# Patient Record
Sex: FEMALE | Race: OTHER | NOT HISPANIC OR LATINO | URBAN - METROPOLITAN AREA
[De-identification: names, ages, dates, MRNs, and addresses within clinical notes are randomized per-mention and may not be internally consistent; named-entity substitution may affect disease eponyms.]

---

## 2024-01-09 ENCOUNTER — INPATIENT (INPATIENT)
Facility: HOSPITAL | Age: 74
LOS: 1 days | Discharge: HOME | End: 2024-01-11
Attending: HOSPITALIST | Admitting: HOSPITALIST
Payer: MEDICAID

## 2024-01-09 VITALS
HEART RATE: 166 BPM | RESPIRATION RATE: 18 BRPM | OXYGEN SATURATION: 99 % | DIASTOLIC BLOOD PRESSURE: 74 MMHG | SYSTOLIC BLOOD PRESSURE: 105 MMHG | WEIGHT: 154.32 LBS | HEIGHT: 65 IN | TEMPERATURE: 98 F

## 2024-01-09 LAB
ANION GAP SERPL CALC-SCNC: 7 MMOL/L — SIGNIFICANT CHANGE UP (ref 5–17)
ANION GAP SERPL CALC-SCNC: 7 MMOL/L — SIGNIFICANT CHANGE UP (ref 5–17)
APTT BLD: 29.3 SEC — SIGNIFICANT CHANGE UP (ref 24.5–35.6)
APTT BLD: 29.3 SEC — SIGNIFICANT CHANGE UP (ref 24.5–35.6)
BASOPHILS # BLD AUTO: 0.05 K/UL — SIGNIFICANT CHANGE UP (ref 0–0.2)
BASOPHILS # BLD AUTO: 0.05 K/UL — SIGNIFICANT CHANGE UP (ref 0–0.2)
BASOPHILS NFR BLD AUTO: 1.1 % — SIGNIFICANT CHANGE UP (ref 0–2)
BASOPHILS NFR BLD AUTO: 1.1 % — SIGNIFICANT CHANGE UP (ref 0–2)
BUN SERPL-MCNC: 33 MG/DL — HIGH (ref 7–23)
BUN SERPL-MCNC: 33 MG/DL — HIGH (ref 7–23)
CALCIUM SERPL-MCNC: 9.6 MG/DL — SIGNIFICANT CHANGE UP (ref 8.5–10.1)
CALCIUM SERPL-MCNC: 9.6 MG/DL — SIGNIFICANT CHANGE UP (ref 8.5–10.1)
CHLORIDE SERPL-SCNC: 103 MMOL/L — SIGNIFICANT CHANGE UP (ref 96–108)
CHLORIDE SERPL-SCNC: 103 MMOL/L — SIGNIFICANT CHANGE UP (ref 96–108)
CO2 SERPL-SCNC: 22 MMOL/L — SIGNIFICANT CHANGE UP (ref 22–31)
CO2 SERPL-SCNC: 22 MMOL/L — SIGNIFICANT CHANGE UP (ref 22–31)
CREAT SERPL-MCNC: 1.88 MG/DL — HIGH (ref 0.5–1.3)
CREAT SERPL-MCNC: 1.88 MG/DL — HIGH (ref 0.5–1.3)
EGFR: 28 ML/MIN/1.73M2 — LOW
EGFR: 28 ML/MIN/1.73M2 — LOW
EOSINOPHIL # BLD AUTO: 0.07 K/UL — SIGNIFICANT CHANGE UP (ref 0–0.5)
EOSINOPHIL # BLD AUTO: 0.07 K/UL — SIGNIFICANT CHANGE UP (ref 0–0.5)
EOSINOPHIL NFR BLD AUTO: 1.5 % — SIGNIFICANT CHANGE UP (ref 0–6)
EOSINOPHIL NFR BLD AUTO: 1.5 % — SIGNIFICANT CHANGE UP (ref 0–6)
FLUAV AG NPH QL: SIGNIFICANT CHANGE UP
FLUAV AG NPH QL: SIGNIFICANT CHANGE UP
FLUBV AG NPH QL: SIGNIFICANT CHANGE UP
FLUBV AG NPH QL: SIGNIFICANT CHANGE UP
GLUCOSE SERPL-MCNC: 116 MG/DL — HIGH (ref 70–99)
GLUCOSE SERPL-MCNC: 116 MG/DL — HIGH (ref 70–99)
HCT VFR BLD CALC: 33.7 % — LOW (ref 34.5–45)
HCT VFR BLD CALC: 33.7 % — LOW (ref 34.5–45)
HGB BLD-MCNC: 11.1 G/DL — LOW (ref 11.5–15.5)
HGB BLD-MCNC: 11.1 G/DL — LOW (ref 11.5–15.5)
IMM GRANULOCYTES NFR BLD AUTO: 0.4 % — SIGNIFICANT CHANGE UP (ref 0–0.9)
IMM GRANULOCYTES NFR BLD AUTO: 0.4 % — SIGNIFICANT CHANGE UP (ref 0–0.9)
INR BLD: 0.88 RATIO — SIGNIFICANT CHANGE UP (ref 0.85–1.18)
INR BLD: 0.88 RATIO — SIGNIFICANT CHANGE UP (ref 0.85–1.18)
LYMPHOCYTES # BLD AUTO: 1.21 K/UL — SIGNIFICANT CHANGE UP (ref 1–3.3)
LYMPHOCYTES # BLD AUTO: 1.21 K/UL — SIGNIFICANT CHANGE UP (ref 1–3.3)
LYMPHOCYTES # BLD AUTO: 26.3 % — SIGNIFICANT CHANGE UP (ref 13–44)
LYMPHOCYTES # BLD AUTO: 26.3 % — SIGNIFICANT CHANGE UP (ref 13–44)
MCHC RBC-ENTMCNC: 30.2 PG — SIGNIFICANT CHANGE UP (ref 27–34)
MCHC RBC-ENTMCNC: 30.2 PG — SIGNIFICANT CHANGE UP (ref 27–34)
MCHC RBC-ENTMCNC: 32.9 G/DL — SIGNIFICANT CHANGE UP (ref 32–36)
MCHC RBC-ENTMCNC: 32.9 G/DL — SIGNIFICANT CHANGE UP (ref 32–36)
MCV RBC AUTO: 91.8 FL — SIGNIFICANT CHANGE UP (ref 80–100)
MCV RBC AUTO: 91.8 FL — SIGNIFICANT CHANGE UP (ref 80–100)
MONOCYTES # BLD AUTO: 0.44 K/UL — SIGNIFICANT CHANGE UP (ref 0–0.9)
MONOCYTES # BLD AUTO: 0.44 K/UL — SIGNIFICANT CHANGE UP (ref 0–0.9)
MONOCYTES NFR BLD AUTO: 9.6 % — SIGNIFICANT CHANGE UP (ref 2–14)
MONOCYTES NFR BLD AUTO: 9.6 % — SIGNIFICANT CHANGE UP (ref 2–14)
NEUTROPHILS # BLD AUTO: 2.81 K/UL — SIGNIFICANT CHANGE UP (ref 1.8–7.4)
NEUTROPHILS # BLD AUTO: 2.81 K/UL — SIGNIFICANT CHANGE UP (ref 1.8–7.4)
NEUTROPHILS NFR BLD AUTO: 61.1 % — SIGNIFICANT CHANGE UP (ref 43–77)
NEUTROPHILS NFR BLD AUTO: 61.1 % — SIGNIFICANT CHANGE UP (ref 43–77)
NRBC # BLD: 0 /100 WBCS — SIGNIFICANT CHANGE UP (ref 0–0)
NRBC # BLD: 0 /100 WBCS — SIGNIFICANT CHANGE UP (ref 0–0)
PLATELET # BLD AUTO: 197 K/UL — SIGNIFICANT CHANGE UP (ref 150–400)
PLATELET # BLD AUTO: 197 K/UL — SIGNIFICANT CHANGE UP (ref 150–400)
POTASSIUM SERPL-MCNC: 4.2 MMOL/L — SIGNIFICANT CHANGE UP (ref 3.5–5.3)
POTASSIUM SERPL-MCNC: 4.2 MMOL/L — SIGNIFICANT CHANGE UP (ref 3.5–5.3)
POTASSIUM SERPL-SCNC: 4.2 MMOL/L — SIGNIFICANT CHANGE UP (ref 3.5–5.3)
POTASSIUM SERPL-SCNC: 4.2 MMOL/L — SIGNIFICANT CHANGE UP (ref 3.5–5.3)
PROTHROM AB SERPL-ACNC: 10.6 SEC — SIGNIFICANT CHANGE UP (ref 9.5–13)
PROTHROM AB SERPL-ACNC: 10.6 SEC — SIGNIFICANT CHANGE UP (ref 9.5–13)
RBC # BLD: 3.67 M/UL — LOW (ref 3.8–5.2)
RBC # BLD: 3.67 M/UL — LOW (ref 3.8–5.2)
RBC # FLD: 12.3 % — SIGNIFICANT CHANGE UP (ref 10.3–14.5)
RBC # FLD: 12.3 % — SIGNIFICANT CHANGE UP (ref 10.3–14.5)
SARS-COV-2 RNA SPEC QL NAA+PROBE: SIGNIFICANT CHANGE UP
SARS-COV-2 RNA SPEC QL NAA+PROBE: SIGNIFICANT CHANGE UP
SODIUM SERPL-SCNC: 132 MMOL/L — LOW (ref 135–145)
SODIUM SERPL-SCNC: 132 MMOL/L — LOW (ref 135–145)
TROPONIN I, HIGH SENSITIVITY RESULT: 231.1 NG/L — HIGH
TROPONIN I, HIGH SENSITIVITY RESULT: 231.1 NG/L — HIGH
TSH SERPL-MCNC: 1.94 UIU/ML — SIGNIFICANT CHANGE UP (ref 0.36–3.74)
TSH SERPL-MCNC: 1.94 UIU/ML — SIGNIFICANT CHANGE UP (ref 0.36–3.74)
WBC # BLD: 4.6 K/UL — SIGNIFICANT CHANGE UP (ref 3.8–10.5)
WBC # BLD: 4.6 K/UL — SIGNIFICANT CHANGE UP (ref 3.8–10.5)
WBC # FLD AUTO: 4.6 K/UL — SIGNIFICANT CHANGE UP (ref 3.8–10.5)
WBC # FLD AUTO: 4.6 K/UL — SIGNIFICANT CHANGE UP (ref 3.8–10.5)

## 2024-01-09 PROCEDURE — 99223 1ST HOSP IP/OBS HIGH 75: CPT

## 2024-01-09 PROCEDURE — 93010 ELECTROCARDIOGRAM REPORT: CPT

## 2024-01-09 PROCEDURE — 71045 X-RAY EXAM CHEST 1 VIEW: CPT | Mod: 26

## 2024-01-09 PROCEDURE — 99291 CRITICAL CARE FIRST HOUR: CPT

## 2024-01-09 RX ORDER — HEPARIN SODIUM 5000 [USP'U]/ML
4100 INJECTION INTRAVENOUS; SUBCUTANEOUS ONCE
Refills: 0 | Status: COMPLETED | OUTPATIENT
Start: 2024-01-09 | End: 2024-01-09

## 2024-01-09 RX ORDER — ALLOPURINOL 300 MG
1 TABLET ORAL
Refills: 0 | DISCHARGE

## 2024-01-09 RX ORDER — CALCIUM CARBONATE 500(1250)
1 TABLET ORAL
Refills: 0 | DISCHARGE

## 2024-01-09 RX ORDER — SODIUM CHLORIDE 9 MG/ML
1000 INJECTION INTRAMUSCULAR; INTRAVENOUS; SUBCUTANEOUS ONCE
Refills: 0 | Status: COMPLETED | OUTPATIENT
Start: 2024-01-09 | End: 2024-01-09

## 2024-01-09 RX ORDER — HEPARIN SODIUM 5000 [USP'U]/ML
INJECTION INTRAVENOUS; SUBCUTANEOUS
Qty: 25000 | Refills: 0 | Status: DISCONTINUED | OUTPATIENT
Start: 2024-01-09 | End: 2024-01-11

## 2024-01-09 RX ORDER — ADENOSINE 3 MG/ML
6 INJECTION INTRAVENOUS ONCE
Refills: 0 | Status: COMPLETED | OUTPATIENT
Start: 2024-01-09 | End: 2024-01-09

## 2024-01-09 RX ORDER — HEPARIN SODIUM 5000 [USP'U]/ML
4100 INJECTION INTRAVENOUS; SUBCUTANEOUS EVERY 6 HOURS
Refills: 0 | Status: DISCONTINUED | OUTPATIENT
Start: 2024-01-09 | End: 2024-01-11

## 2024-01-09 RX ADMIN — HEPARIN SODIUM 4100 UNIT(S): 5000 INJECTION INTRAVENOUS; SUBCUTANEOUS at 22:08

## 2024-01-09 RX ADMIN — HEPARIN SODIUM 800 UNIT(S)/HR: 5000 INJECTION INTRAVENOUS; SUBCUTANEOUS at 22:09

## 2024-01-09 RX ADMIN — ADENOSINE 6 MILLIGRAM(S): 3 INJECTION INTRAVENOUS at 19:49

## 2024-01-09 RX ADMIN — SODIUM CHLORIDE 1000 MILLILITER(S): 9 INJECTION INTRAMUSCULAR; INTRAVENOUS; SUBCUTANEOUS at 19:48

## 2024-01-09 NOTE — ED ADULT TRIAGE NOTE - CHIEF COMPLAINT QUOTE
Patient brought from airport: she blacked out and fell when boarding a flight. Patient reports chest pain radiating to neck was initially 10/10 now 2/10. Patient reports dizziness. patient EKG done prior to triage.

## 2024-01-09 NOTE — ED ADULT NURSE REASSESSMENT NOTE - NS ED NURSE REASSESS COMMENT FT1
Pt placed on continuous SPO2 and cardiac monitoring, defib pads in place. Adenosine given via rapid IVP with stopcock, pt now in sinus tach. Reports relief of symptoms. Fluids infusing and labs sent per order. Plan of care ongoing.

## 2024-01-09 NOTE — ED ADULT NURSE NOTE - NSFALLRISKINTERV_ED_ALL_ED
Assistance OOB with selected safe patient handling equipment if applicable/Communicate fall risk and risk factors to all staff, patient, and family/Orthostatic vital signs/Provide visual cue: yellow wristband, yellow gown, etc/Reinforce activity limits and safety measures with patient and family/Call bell, personal items and telephone in reach/Instruct patient to call for assistance before getting out of bed/chair/stretcher/Non-slip footwear applied when patient is off stretcher/[place] to call system/Physically safe environment - no spills, clutter or unnecessary equipment/Purposeful Proactive Rounding/Room/bathroom lighting operational, light cord in reach Assistance OOB with selected safe patient handling equipment if applicable/Communicate fall risk and risk factors to all staff, patient, and family/Orthostatic vital signs/Provide visual cue: yellow wristband, yellow gown, etc/Reinforce activity limits and safety measures with patient and family/Call bell, personal items and telephone in reach/Instruct patient to call for assistance before getting out of bed/chair/stretcher/Non-slip footwear applied when patient is off stretcher/[place] to call system/Physically safe environment - no spills, clutter or unnecessary equipment/Purposeful Proactive Rounding/Room/bathroom lighting operational, light cord in reach

## 2024-01-09 NOTE — PHARMACOTHERAPY INTERVENTION NOTE - COMMENTS
Performed medication reconciliation and home medication list updated in outpatient medication review. Medications verified with patient and bedside medications.     Of note, patient does not live in the [place] and fills her medications from [place].    HOME MEDICATIONS/PRESCRIPTIONS:  ·	allopurinol 100 mg oral tablet: 1 tab(s) orally once a day  ·	baclofen 10 mg oral tablet: 1 tab(s) orally once a day  ·	calcium (as carbonate) 500 mg oral tablet: 1 tab(s) orally once a day  ·	candesartan 16 mg oral tablet: 1 tab(s) orally once a day  ·	domperidone 10mg: Take 1 tab once a day PRN gas

## 2024-01-09 NOTE — PHARMACOTHERAPY INTERVENTION NOTE - COMMENTS
Pharmacy at bedside with ED provider to assess patient. Recommended adenosine 6mg for SVT, HR 160s.  Pharmacy at bedside with ED provider to assess patient. Recommended adenosine 6mg for SVT, HR 160s. If tachycardia persists, consider IV diltiazem 20mg (~0.25mg/kg) and PO diltiazem 60mg.

## 2024-01-09 NOTE — ED ADULT NURSE NOTE - OBJECTIVE STATEMENT
Pt is AOx4 c/o chest pain. Pt was standing in line at airport and syncopized. Initially c/o 10/10 chest pain radiating to L shoulder, now 2/10. Pt also reports dizziness, nausea, lethargy, palpitations. HR 160s, SVT in triage. Brought to [facility] and placed on cardiac monitoring and zoll.

## 2024-01-09 NOTE — ED PROVIDER NOTE - CRITICAL CARE ATTENDING CONTRIBUTION TO CARE
Upon my evaluation, this patient had a high probability of imminent or life-threatening deterioration due to SVT, which required my direct attention, intervention, and personal management.  The patient has a  medical condition that impairs one or more vital organ systems.  Frequent personal assessment and adjustment of medical interventions was performed.      I have personally provided 30 minutes of critical care time exclusive of time spent on separately billable procedures. Time includes review of laboratory data, radiology results, discussion with consultants, patient and family; monitoring for potential decompensation, as well as time spent retrieving data and reviewing the chart and documenting the visit. Interventions were performed as documented above.

## 2024-01-09 NOTE — ED PROVIDER NOTE - PHYSICAL EXAMINATION
General: Well appearing female in no acute distress  HEENT: Normocephalic, atraumatic. Moist mucous membranes. Oropharynx clear. No lymphadenopathy.  Eyes: No scleral icterus. EOMI. CHRISTIANO.  Neck:. Soft and supple. Full ROM without pain. No midline tenderness  Cardiac: Regular rate and regular rhythm. No murmurs, rubs, gallops. Peripheral pulses 2+ and symmetric. No LE edema.  Resp: Lungs CTAB. Speaking in full sentences. No wheezes, rales or rhonchi.  Abd: Soft, non-tender, non-distended. No guarding or rebound. No scars, masses, or lesions.  Back: Spine midline and non-tender. No CVA tenderness.    Skin: No rashes, abrasions, or lacerations.  Neuro: AO x 3. Moves all extremities symmetrically. Motor strength and sensation grossly intact.

## 2024-01-09 NOTE — ED ADULT NURSE NOTE - AS PAIN REST
[facility]  Andrae Mosleypilar 63 [id number] [facility], [address] [place] [address]    Consent for Operation    Date: __________________    Time: _______________    1.  I authorize the performance upon Sharif Nicholson the following operation:                                         Ci the original videotape. [facility] will not be responsible for storage or maintenance of this tape. 6. For the purpose of advancing medical education, I consent to the admittance of observers to the Operating Room.     7. I authorize the use of any spe ___________________________    When the patient is a minor or mentally incompetent to give consent:  Signature of person authorized to consent for patient: ___________________________   Relationship to patient: _____________________________________________ the plastic. Let the scab fall off by itself. • Allow the ring to fall off by itself. The plastic ring usually falls off five to eight days after the circumcision.     • No special dressing is required, and the baby can be bathed and diapered as if he 2 (mild pain)

## 2024-01-09 NOTE — ED PROVIDER NOTE - CLINICAL SUMMARY MEDICAL DECISION MAKING FREE TEXT BOX
72 y/o F hx of cardiac disease , kidney stent , HTN presents for palpitations. was boarding a flight at aproximately 5:50PM when developed chest pain and lightheadedness. states chest pain reoslved prior ot arrival to ER but endorsing feeling "low energy."  consider SVT on initial EKG , HR fluctuating 140s-160s. consider aflutter 2:1 block. 6 mg adenosine given IVP w/ response, HR fluctates between low 100s and 110s. will send labs. repeat EKG sinus tachycardia. normotensive throughout. patient placed on pads prior to adenosine being given. 72 y/o F hx of cardiac disease , kidney stent , HTN presents for palpitations. was boarding a flight at approximately 5:50PM when developed chest pain and lightheadedness. states chest pain resolved prior ot arrival to ER but endorsing feeling "low energy."  consider SVT on initial EKG , HR fluctuating 140s-160s. consider aflutter 2:1 block. 6 mg adenosine given IVP w/ response, HR fluctates between low 100s and 110s. will send labs. repeat EKG sinus tachycardia. normotensive throughout. patient placed on pads prior to adenosine being given. 74 y/o F hx of cardiac disease , kidney stent , HTN presents for palpitations. was boarding a flight at approximately 5:50PM when developed chest pain and lightheadedness. states chest pain resolved prior ot arrival to ER but endorsing feeling "low energy."  consider SVT on initial EKG , HR fluctuating 140s-160s. consider aflutter 2:1 block. 6 mg adenosine given IVP w/ response, HR fluctates between low 100s and 110s. will send labs. repeat EKG sinus tachycardia. normotensive throughout. patient placed on pads prior to adenosine being given.    heparin initiated, troponin is likely elevated secondary to SVT episode but patient did have chest pain/syncope. admit to medicine/tele bed for further evaluation. patient endorsed improved symptoms s/p adenosine , well appearing. 72 y/o F hx of cardiac disease , kidney stent , HTN presents for palpitations. was boarding a flight at approximately 5:50PM when developed chest pain and lightheadedness. states chest pain resolved prior ot arrival to ER but endorsing feeling "low energy."  consider SVT on initial EKG , HR fluctuating 140s-160s. consider aflutter 2:1 block. 6 mg adenosine given IVP w/ response, HR fluctates between low 100s and 110s. will send labs. repeat EKG sinus tachycardia. normotensive throughout. patient placed on pads prior to adenosine being given.    heparin initiated, troponin is likely elevated secondary to SVT episode but patient did have chest pain/syncope. admit to medicine/tele bed for further evaluation. patient endorsed improved symptoms s/p adenosine , well appearing.

## 2024-01-09 NOTE — ED PROVIDER NOTE - OBJECTIVE STATEMENT
72 y/o F hx of cardiac disease , kidney stent , HTN presents for palpitations. was boarding a flight at aproximately 5:50PM when developed chest pain and lightheadedness. states chest pain reoslved prior ot arrival to ER but endorsing feeling "low energy." 74 y/o F hx of cardiac disease , kidney stent , HTN presents for palpitations. was boarding a flight at approximately 5:50PM when developed chest pain and lightheadedness. states chest pain resolved prior ot arrival to ER but endorsing feeling "low energy." Denies any active chest pain currently. denies sob. denies abdominal pain. endorsing nausea but no emesis. denies trauma/falls. 72 y/o F hx of cardiac disease , kidney stent , HTN presents for palpitations. was boarding a flight at approximately 5:50PM when developed chest pain and lightheadedness. states chest pain resolved prior ot arrival to ER but endorsing feeling "low energy." Denies any active chest pain currently. denies sob. denies abdominal pain. endorsing nausea but no emesis. denies trauma/falls.

## 2024-01-10 DIAGNOSIS — I10 ESSENTIAL (PRIMARY) HYPERTENSION: ICD-10-CM

## 2024-01-10 DIAGNOSIS — I21.4 NON-ST ELEVATION (NSTEMI) MYOCARDIAL INFARCTION: ICD-10-CM

## 2024-01-10 DIAGNOSIS — R55 SYNCOPE AND COLLAPSE: ICD-10-CM

## 2024-01-10 LAB
APTT BLD: 55.4 SEC — HIGH (ref 24.5–35.6)
APTT BLD: 55.4 SEC — HIGH (ref 24.5–35.6)
APTT BLD: 72.7 SEC — HIGH (ref 24.5–35.6)
APTT BLD: 72.7 SEC — HIGH (ref 24.5–35.6)
HCT VFR BLD CALC: 29.4 % — LOW (ref 34.5–45)
HCT VFR BLD CALC: 29.4 % — LOW (ref 34.5–45)
HGB BLD-MCNC: 9.6 G/DL — LOW (ref 11.5–15.5)
HGB BLD-MCNC: 9.6 G/DL — LOW (ref 11.5–15.5)
MCHC RBC-ENTMCNC: 29.6 PG — SIGNIFICANT CHANGE UP (ref 27–34)
MCHC RBC-ENTMCNC: 29.6 PG — SIGNIFICANT CHANGE UP (ref 27–34)
MCHC RBC-ENTMCNC: 32.7 G/DL — SIGNIFICANT CHANGE UP (ref 32–36)
MCHC RBC-ENTMCNC: 32.7 G/DL — SIGNIFICANT CHANGE UP (ref 32–36)
MCV RBC AUTO: 90.7 FL — SIGNIFICANT CHANGE UP (ref 80–100)
MCV RBC AUTO: 90.7 FL — SIGNIFICANT CHANGE UP (ref 80–100)
NRBC # BLD: 0 /100 WBCS — SIGNIFICANT CHANGE UP (ref 0–0)
NRBC # BLD: 0 /100 WBCS — SIGNIFICANT CHANGE UP (ref 0–0)
PLATELET # BLD AUTO: 172 K/UL — SIGNIFICANT CHANGE UP (ref 150–400)
PLATELET # BLD AUTO: 172 K/UL — SIGNIFICANT CHANGE UP (ref 150–400)
RBC # BLD: 3.24 M/UL — LOW (ref 3.8–5.2)
RBC # BLD: 3.24 M/UL — LOW (ref 3.8–5.2)
RBC # FLD: 12.3 % — SIGNIFICANT CHANGE UP (ref 10.3–14.5)
RBC # FLD: 12.3 % — SIGNIFICANT CHANGE UP (ref 10.3–14.5)
T4 AB SER-ACNC: 8.7 UG/DL — SIGNIFICANT CHANGE UP (ref 4.6–12)
T4 AB SER-ACNC: 8.7 UG/DL — SIGNIFICANT CHANGE UP (ref 4.6–12)
TROPONIN I, HIGH SENSITIVITY RESULT: 1454.6 NG/L — HIGH
TROPONIN I, HIGH SENSITIVITY RESULT: 1454.6 NG/L — HIGH
TROPONIN I, HIGH SENSITIVITY RESULT: 3185.2 NG/L — HIGH
TROPONIN I, HIGH SENSITIVITY RESULT: 3185.2 NG/L — HIGH
WBC # BLD: 4.44 K/UL — SIGNIFICANT CHANGE UP (ref 3.8–10.5)
WBC # BLD: 4.44 K/UL — SIGNIFICANT CHANGE UP (ref 3.8–10.5)
WBC # FLD AUTO: 4.44 K/UL — SIGNIFICANT CHANGE UP (ref 3.8–10.5)
WBC # FLD AUTO: 4.44 K/UL — SIGNIFICANT CHANGE UP (ref 3.8–10.5)

## 2024-01-10 PROCEDURE — 93306 TTE W/DOPPLER COMPLETE: CPT | Mod: 26

## 2024-01-10 PROCEDURE — 93880 EXTRACRANIAL BILAT STUDY: CPT | Mod: 26

## 2024-01-10 RX ORDER — ONDANSETRON 8 MG/1
4 TABLET, FILM COATED ORAL EVERY 8 HOURS
Refills: 0 | Status: DISCONTINUED | OUTPATIENT
Start: 2024-01-10 | End: 2024-01-10

## 2024-01-10 RX ORDER — CALCIUM CARBONATE 500(1250)
1 TABLET ORAL DAILY
Refills: 0 | Status: DISCONTINUED | OUTPATIENT
Start: 2024-01-10 | End: 2024-01-11

## 2024-01-10 RX ORDER — LANOLIN ALCOHOL/MO/W.PET/CERES
3 CREAM (GRAM) TOPICAL AT BEDTIME
Refills: 0 | Status: DISCONTINUED | OUTPATIENT
Start: 2024-01-10 | End: 2024-01-11

## 2024-01-10 RX ORDER — ASPIRIN/CALCIUM CARB/MAGNESIUM 324 MG
81 TABLET ORAL DAILY
Refills: 0 | Status: DISCONTINUED | OUTPATIENT
Start: 2024-01-10 | End: 2024-01-11

## 2024-01-10 RX ORDER — ATORVASTATIN CALCIUM 80 MG/1
40 TABLET, FILM COATED ORAL AT BEDTIME
Refills: 0 | Status: DISCONTINUED | OUTPATIENT
Start: 2024-01-10 | End: 2024-01-11

## 2024-01-10 RX ORDER — ASPIRIN/CALCIUM CARB/MAGNESIUM 324 MG
324 TABLET ORAL ONCE
Refills: 0 | Status: COMPLETED | OUTPATIENT
Start: 2024-01-10 | End: 2024-01-10

## 2024-01-10 RX ORDER — CLOPIDOGREL BISULFATE 75 MG/1
75 TABLET, FILM COATED ORAL DAILY
Refills: 0 | Status: DISCONTINUED | OUTPATIENT
Start: 2024-01-10 | End: 2024-01-11

## 2024-01-10 RX ORDER — ACETAMINOPHEN 500 MG
650 TABLET ORAL EVERY 6 HOURS
Refills: 0 | Status: DISCONTINUED | OUTPATIENT
Start: 2024-01-10 | End: 2024-01-11

## 2024-01-10 RX ORDER — ALLOPURINOL 300 MG
100 TABLET ORAL DAILY
Refills: 0 | Status: DISCONTINUED | OUTPATIENT
Start: 2024-01-10 | End: 2024-01-11

## 2024-01-10 RX ORDER — METOPROLOL TARTRATE 50 MG
25 TABLET ORAL DAILY
Refills: 0 | Status: DISCONTINUED | OUTPATIENT
Start: 2024-01-10 | End: 2024-01-11

## 2024-01-10 RX ORDER — LOSARTAN POTASSIUM 100 MG/1
50 TABLET, FILM COATED ORAL DAILY
Refills: 0 | Status: DISCONTINUED | OUTPATIENT
Start: 2024-01-10 | End: 2024-01-11

## 2024-01-10 RX ADMIN — Medication 324 MILLIGRAM(S): at 02:02

## 2024-01-10 RX ADMIN — HEPARIN SODIUM 800 UNIT(S)/HR: 5000 INJECTION INTRAVENOUS; SUBCUTANEOUS at 04:26

## 2024-01-10 RX ADMIN — Medication 100 MILLIGRAM(S): at 09:37

## 2024-01-10 RX ADMIN — LOSARTAN POTASSIUM 50 MILLIGRAM(S): 100 TABLET, FILM COATED ORAL at 06:19

## 2024-01-10 RX ADMIN — HEPARIN SODIUM 800 UNIT(S)/HR: 5000 INJECTION INTRAVENOUS; SUBCUTANEOUS at 10:31

## 2024-01-10 RX ADMIN — HEPARIN SODIUM 800 UNIT(S)/HR: 5000 INJECTION INTRAVENOUS; SUBCUTANEOUS at 13:01

## 2024-01-10 RX ADMIN — CLOPIDOGREL BISULFATE 75 MILLIGRAM(S): 75 TABLET, FILM COATED ORAL at 12:04

## 2024-01-10 RX ADMIN — Medication 25 MILLIGRAM(S): at 12:04

## 2024-01-10 RX ADMIN — Medication 1 TABLET(S): at 09:38

## 2024-01-10 RX ADMIN — ATORVASTATIN CALCIUM 40 MILLIGRAM(S): 80 TABLET, FILM COATED ORAL at 21:10

## 2024-01-10 RX ADMIN — HEPARIN SODIUM 800 UNIT(S)/HR: 5000 INJECTION INTRAVENOUS; SUBCUTANEOUS at 19:08

## 2024-01-10 RX ADMIN — HEPARIN SODIUM 800 UNIT(S)/HR: 5000 INJECTION INTRAVENOUS; SUBCUTANEOUS at 07:39

## 2024-01-10 RX ADMIN — Medication 81 MILLIGRAM(S): at 09:37

## 2024-01-10 NOTE — ED ADULT NURSE REASSESSMENT NOTE - NS ED NURSE REASSESS COMMENT FT1
Report received by CRISTOBAL guido, patient comfortable in bed, on CM &, no signs of distress noted or reported. updated on POC, Heparin gtt infusing as per protocol. No signs of bleeding.

## 2024-01-10 NOTE — CONSULT NOTE ADULT - ASSESSMENT
73 F HTN reports prior coronary history (remote MI 20y ago) now had syncope palpitations and chest pain while boarding airplane. Trop to 3k Cr 1.88 K 4.2. Patient on ASA, UFH, given single dose adenosine for SVT termination. Suggest add toprol 25mg/d and plavix 75/d, get echo. Likely pre-discharge cath once stabilizes. 73 F HTN reports prior coronary history (remote MI 20y ago) now had syncope palpitations and chest pain while boarding airplane. Trop to 3k Cr 1.88 K 4.2. Patient on ASA, UFH, given single dose adenosine for SVT termination. Suggest add toprol 25mg/d and plavix 75/d, get echo. EKG reviewed no dynamic ST shift likely TYpe 2 MI due to tachycardia. Should follow up with home cardiologist for ischemia investigation.

## 2024-01-10 NOTE — H&P ADULT - HISTORY OF PRESENT ILLNESS
73 year old female with a PMH of MI (20 years ago),  kidney stent, HTN presents to the ED for syncopal episode. As per patient, while boarding a flight this afternoon she started to have heart palpitation, chest pain with  lightheadeness and fell on the aisle due to syncopal episode.  Chest pain resolved prior to ED arrival. Endorses for the past couple months she has been experiencing heart palpitation. Upon evaluation endorses she is back to baseline denies chest pain, palpitations, headache, dizziness or any other pain or discomfort. Upon ED arrival patient was found to be in SVT with HR 150s, responded to Adenosine. Labs remarkable for Troponin 231.1. ECG: No ST-changes. Started on heparin gtt.

## 2024-01-10 NOTE — H&P ADULT - PROBLEM SELECTOR PLAN 1
Troponin 231.1  - Tele  - ECG  - Heparin gtt  - ASA  - Statin   - Cardio consult History of MI 20 years ago  Troponin 231.1--> 3185.2  - Tele  - ECG  - Heparin gtt  - ASA  - Statin   - Cardio consult  - Trend Trop

## 2024-01-10 NOTE — ED ADULT NURSE REASSESSMENT NOTE - NS ED NURSE REASSESS COMMENT FT1
aPTT resulted, within therapeutic range. Infusion continued without change per nomogram. Pt resting in bed on continuous SPO2 and cardiac monitoring. NAD noted at this time.

## 2024-01-10 NOTE — PATIENT PROFILE ADULT - FALL HARM RISK - HARM RISK INTERVENTIONS
Communicate Risk of Fall with Harm to all staff/Reinforce activity limits and safety measures with patient and family/Tailored Fall Risk Interventions/Visual Cue: Yellow wristband and red socks/Bed in lowest position, wheels locked, appropriate side rails in place/Call bell, personal items and telephone in reach/Instruct patient to call for assistance before getting out of bed or chair/Non-slip footwear when patient is out of bed/[place] to call system/Physically safe environment - no spills, clutter or unnecessary equipment/Purposeful Proactive Rounding/Room/bathroom lighting operational, light cord in reach Communicate Risk of Fall with Harm to all staff/Reinforce activity limits and safety measures with patient and family/Tailored Fall Risk Interventions/Visual Cue: Yellow wristband and red socks/Bed in lowest position, wheels locked, appropriate side rails in place/Call bell, personal items and telephone in reach/Instruct patient to call for assistance before getting out of bed or chair/Non-slip footwear when patient is out of bed/[place] to call system/Physically safe environment - no spills, clutter or unnecessary equipment/Purposeful Proactive Rounding/Room/bathroom lighting operational, light cord in reach

## 2024-01-10 NOTE — CONSULT NOTE ADULT - SUBJECTIVE AND OBJECTIVE BOX
73 F HTN reports prior coronary history (remote MI 20y ago) now had syncope palpitations and chest pain while boarding airplane. Trop to 3k Cr 1.88 K 4.2. Patient on ASA, UFH, given single dose adenosine for SVT termination. Suggest add toprol 25mg/d and plavix 75/d, get echo. Likely pre-discharge cath once stabilizes.    Reason for Admission: Syncope, NSTEMI  History of Present Illness:   73 year old female with a PMH of MI (20 years ago),  kidney stent, HTN presents to the ED for syncopal episode. As per patient, while boarding a flight this afternoon she started to have heart palpitation, chest pain with  lightheadeness and fell on the aisle due to syncopal episode.  Chest pain resolved prior to ED arrival. Endorses for the past couple months she has been experiencing heart palpitation. Upon evaluation endorses she is back to baseline denies chest pain, palpitations, headache, dizziness or any other pain or discomfort. Upon ED arrival patient was found to be in SVT with HR 150s, responded to Adenosine. Labs remarkable for Troponin 231.1. ECG: No ST-changes. Started on heparin gtt. Home Medications:   * Patient Currently Takes Medications as of 10-Parish-2024 01:01 documented in Structured Notes  · 	allopurinol 100 mg oral tablet: Last Dose Taken:  , 1 tab(s) orally once a day  · 	calcium (as carbonate) 500 mg oral tablet: Last Dose Taken:  , 1 tab(s) orally once a day  · 	baclofen 10 mg oral tablet: Last Dose Taken:  , 1 tab(s) orally once a day  · 	candesartan 16 mg oral tablet: Last Dose Taken:  , 1 tab(s) orally once a day  · 	domperidone 10mg: Last Dose Taken:  , Take 1 tab once a day PRN gasPhysical Exam:   Physical Exam: GENERAL: NAD, comfortable at bedside   HEAD:  Atraumatic, Normocephalic  EYES: EOMI, PERRL, conjunctiva and sclera clear  NECK: Supple, No JVD  CHEST/LUNG: Clear to auscultation bilaterally; No wheezes, rales or rhonchi  HEART: Regular rate and rhythm; No murmurs, rubs, or gallops, (+)S1, S2  ABDOMEN: Soft, Nontender, Nondistended; Normal Bowel sounds   EXTREMITIES:  2+ Peripheral Pulses, No clubbing, cyanosis, or edema  PSYCH: normal mood and affect  NEUROLOGY: AAOx3, non-focal  SKIN: No rashes or lesions  Labs and Results:  Labs, Radiology, Cardiology, and Other Results: 11.1   4.60  )-----------( 197      ( 09 Jan 2024 19:47 )             33.7     132<L>  |  103  |  33<H>  ----------------------------<  116<H>     01-09  4.2   |  22  |  1.88<H>    Ca    9.6      09 Jan 2024 19:47      PT/INR: 10.6/0.88 (01-09-24 @ 19:47)  PTT: 29.3 (01-09-24 @ 19:47)   73 F HTN reports prior coronary history (remote MI 20y ago) now had syncope palpitations and chest pain while boarding airplane. Trop to 3k Cr 1.88 K 4.2. Patient on ASA, UFH, given single dose adenosine for SVT termination. Suggest add toprol 25mg/d and plavix 75/d, get echo. EKG reviewed no dynamic ST shift likely TYpe 2 MI due to tachycardia. Should follow up with home cardiologist for ischemia investigation.    Reason for Admission: Syncope, NSTEMI  History of Present Illness:   73 year old female with a PMH of MI (20 years ago),  kidney stent, HTN presents to the ED for syncopal episode. As per patient, while boarding a flight this afternoon she started to have heart palpitation, chest pain with  lightheadeness and fell on the aisle due to syncopal episode.  Chest pain resolved prior to ED arrival. Endorses for the past couple months she has been experiencing heart palpitation. Upon evaluation endorses she is back to baseline denies chest pain, palpitations, headache, dizziness or any other pain or discomfort. Upon ED arrival patient was found to be in SVT with HR 150s, responded to Adenosine. Labs remarkable for Troponin 231.1. ECG: No ST-changes. Started on heparin gtt. Home Medications:   * Patient Currently Takes Medications as of 10-Parish-2024 01:01 documented in Structured Notes  · 	allopurinol 100 mg oral tablet: Last Dose Taken:  , 1 tab(s) orally once a day  · 	calcium (as carbonate) 500 mg oral tablet: Last Dose Taken:  , 1 tab(s) orally once a day  · 	baclofen 10 mg oral tablet: Last Dose Taken:  , 1 tab(s) orally once a day  · 	candesartan 16 mg oral tablet: Last Dose Taken:  , 1 tab(s) orally once a day  · 	domperidone 10mg: Last Dose Taken:  , Take 1 tab once a day PRN gasPhysical Exam:   Physical Exam: GENERAL: NAD, comfortable at bedside   HEAD:  Atraumatic, Normocephalic  EYES: EOMI, PERRL, conjunctiva and sclera clear  NECK: Supple, No JVD  CHEST/LUNG: Clear to auscultation bilaterally; No wheezes, rales or rhonchi  HEART: Regular rate and rhythm; No murmurs, rubs, or gallops, (+)S1, S2  ABDOMEN: Soft, Nontender, Nondistended; Normal Bowel sounds   EXTREMITIES:  2+ Peripheral Pulses, No clubbing, cyanosis, or edema  PSYCH: normal mood and affect  NEUROLOGY: AAOx3, non-focal  SKIN: No rashes or lesions  Labs and Results:  Labs, Radiology, Cardiology, and Other Results: 11.1   4.60  )-----------( 197      ( 09 Jan 2024 19:47 )             33.7     132<L>  |  103  |  33<H>  ----------------------------<  116<H>     01-09  4.2   |  22  |  1.88<H>    Ca    9.6      09 Jan 2024 19:47      PT/INR: 10.6/0.88 (01-09-24 @ 19:47)  PTT: 29.3 (01-09-24 @ 19:47)

## 2024-01-10 NOTE — H&P ADULT - NSHPLABSRESULTS_GEN_ALL_CORE
11.1   4.60  )-----------( 197      ( 09 Jan 2024 19:47 )             33.7     132<L>  |  103  |  33<H>  ----------------------------<  116<H>     01-09  4.2   |  22  |  1.88<H>    Ca    9.6      09 Jan 2024 19:47      PT/INR: 10.6/0.88 (01-09-24 @ 19:47)  PTT: 29.3 (01-09-24 @ 19:47)

## 2024-01-10 NOTE — CHART NOTE - NSCHARTNOTEFT_GEN_A_CORE
H&P dated today reviewed in full. H&P dated today reviewed in full. Had syncope on plane, admitted to tele, trop elevated, down to 1,454 today.     Follow trop in AM, TTE, continue all present meds, cardio follow up appreciated. No CP or SOB now.

## 2024-01-10 NOTE — H&P ADULT - PROBLEM SELECTOR PLAN 2
- Tele  - Fall precaution   - Orthostatic vitals  - Neuro-checks Q4hrs   - Echo   - Carotid doppler   - Cardio consult   - DVT Prophylaxis (Heparin gtt)   - F/u labs

## 2024-01-11 VITALS
SYSTOLIC BLOOD PRESSURE: 128 MMHG | OXYGEN SATURATION: 97 % | RESPIRATION RATE: 18 BRPM | TEMPERATURE: 98 F | HEART RATE: 74 BPM | DIASTOLIC BLOOD PRESSURE: 76 MMHG

## 2024-01-11 LAB
A1C WITH ESTIMATED AVERAGE GLUCOSE RESULT: 5.8 % — HIGH (ref 4–5.6)
A1C WITH ESTIMATED AVERAGE GLUCOSE RESULT: 5.8 % — HIGH (ref 4–5.6)
ALBUMIN SERPL ELPH-MCNC: 3.1 G/DL — LOW (ref 3.3–5)
ALBUMIN SERPL ELPH-MCNC: 3.1 G/DL — LOW (ref 3.3–5)
ALP SERPL-CCNC: 70 U/L — SIGNIFICANT CHANGE UP (ref 40–120)
ALP SERPL-CCNC: 70 U/L — SIGNIFICANT CHANGE UP (ref 40–120)
ALT FLD-CCNC: 14 U/L — SIGNIFICANT CHANGE UP (ref 12–78)
ALT FLD-CCNC: 14 U/L — SIGNIFICANT CHANGE UP (ref 12–78)
ANION GAP SERPL CALC-SCNC: 4 MMOL/L — LOW (ref 5–17)
ANION GAP SERPL CALC-SCNC: 4 MMOL/L — LOW (ref 5–17)
APTT BLD: 61.8 SEC — HIGH (ref 24.5–35.6)
APTT BLD: 61.8 SEC — HIGH (ref 24.5–35.6)
AST SERPL-CCNC: 16 U/L — SIGNIFICANT CHANGE UP (ref 15–37)
AST SERPL-CCNC: 16 U/L — SIGNIFICANT CHANGE UP (ref 15–37)
BILIRUB SERPL-MCNC: 0.7 MG/DL — SIGNIFICANT CHANGE UP (ref 0.2–1.2)
BILIRUB SERPL-MCNC: 0.7 MG/DL — SIGNIFICANT CHANGE UP (ref 0.2–1.2)
BUN SERPL-MCNC: 29 MG/DL — HIGH (ref 7–23)
BUN SERPL-MCNC: 29 MG/DL — HIGH (ref 7–23)
CALCIUM SERPL-MCNC: 9.1 MG/DL — SIGNIFICANT CHANGE UP (ref 8.5–10.1)
CALCIUM SERPL-MCNC: 9.1 MG/DL — SIGNIFICANT CHANGE UP (ref 8.5–10.1)
CHLORIDE SERPL-SCNC: 108 MMOL/L — SIGNIFICANT CHANGE UP (ref 96–108)
CHLORIDE SERPL-SCNC: 108 MMOL/L — SIGNIFICANT CHANGE UP (ref 96–108)
CHOLEST SERPL-MCNC: 213 MG/DL — HIGH
CHOLEST SERPL-MCNC: 213 MG/DL — HIGH
CO2 SERPL-SCNC: 24 MMOL/L — SIGNIFICANT CHANGE UP (ref 22–31)
CO2 SERPL-SCNC: 24 MMOL/L — SIGNIFICANT CHANGE UP (ref 22–31)
CREAT SERPL-MCNC: 1.46 MG/DL — HIGH (ref 0.5–1.3)
CREAT SERPL-MCNC: 1.46 MG/DL — HIGH (ref 0.5–1.3)
EGFR: 38 ML/MIN/1.73M2 — LOW
EGFR: 38 ML/MIN/1.73M2 — LOW
ESTIMATED AVERAGE GLUCOSE: 120 MG/DL — HIGH (ref 68–114)
ESTIMATED AVERAGE GLUCOSE: 120 MG/DL — HIGH (ref 68–114)
GLUCOSE SERPL-MCNC: 96 MG/DL — SIGNIFICANT CHANGE UP (ref 70–99)
GLUCOSE SERPL-MCNC: 96 MG/DL — SIGNIFICANT CHANGE UP (ref 70–99)
HCT VFR BLD CALC: 31.8 % — LOW (ref 34.5–45)
HCT VFR BLD CALC: 31.8 % — LOW (ref 34.5–45)
HCV AB S/CO SERPL IA: 0.09 S/CO — SIGNIFICANT CHANGE UP (ref 0–0.99)
HCV AB S/CO SERPL IA: 0.09 S/CO — SIGNIFICANT CHANGE UP (ref 0–0.99)
HCV AB SERPL-IMP: SIGNIFICANT CHANGE UP
HCV AB SERPL-IMP: SIGNIFICANT CHANGE UP
HDLC SERPL-MCNC: 55 MG/DL — SIGNIFICANT CHANGE UP
HDLC SERPL-MCNC: 55 MG/DL — SIGNIFICANT CHANGE UP
HGB BLD-MCNC: 10.3 G/DL — LOW (ref 11.5–15.5)
HGB BLD-MCNC: 10.3 G/DL — LOW (ref 11.5–15.5)
LIPID PNL WITH DIRECT LDL SERPL: 147 MG/DL — HIGH
LIPID PNL WITH DIRECT LDL SERPL: 147 MG/DL — HIGH
MCHC RBC-ENTMCNC: 29.9 PG — SIGNIFICANT CHANGE UP (ref 27–34)
MCHC RBC-ENTMCNC: 29.9 PG — SIGNIFICANT CHANGE UP (ref 27–34)
MCHC RBC-ENTMCNC: 32.4 G/DL — SIGNIFICANT CHANGE UP (ref 32–36)
MCHC RBC-ENTMCNC: 32.4 G/DL — SIGNIFICANT CHANGE UP (ref 32–36)
MCV RBC AUTO: 92.4 FL — SIGNIFICANT CHANGE UP (ref 80–100)
MCV RBC AUTO: 92.4 FL — SIGNIFICANT CHANGE UP (ref 80–100)
NON HDL CHOLESTEROL: 158 MG/DL — HIGH
NON HDL CHOLESTEROL: 158 MG/DL — HIGH
NRBC # BLD: 0 /100 WBCS — SIGNIFICANT CHANGE UP (ref 0–0)
NRBC # BLD: 0 /100 WBCS — SIGNIFICANT CHANGE UP (ref 0–0)
PLATELET # BLD AUTO: 186 K/UL — SIGNIFICANT CHANGE UP (ref 150–400)
PLATELET # BLD AUTO: 186 K/UL — SIGNIFICANT CHANGE UP (ref 150–400)
POTASSIUM SERPL-MCNC: 4.6 MMOL/L — SIGNIFICANT CHANGE UP (ref 3.5–5.3)
POTASSIUM SERPL-MCNC: 4.6 MMOL/L — SIGNIFICANT CHANGE UP (ref 3.5–5.3)
POTASSIUM SERPL-SCNC: 4.6 MMOL/L — SIGNIFICANT CHANGE UP (ref 3.5–5.3)
POTASSIUM SERPL-SCNC: 4.6 MMOL/L — SIGNIFICANT CHANGE UP (ref 3.5–5.3)
PROT SERPL-MCNC: 6.2 GM/DL — SIGNIFICANT CHANGE UP (ref 6–8.3)
PROT SERPL-MCNC: 6.2 GM/DL — SIGNIFICANT CHANGE UP (ref 6–8.3)
RBC # BLD: 3.44 M/UL — LOW (ref 3.8–5.2)
RBC # BLD: 3.44 M/UL — LOW (ref 3.8–5.2)
RBC # FLD: 12.4 % — SIGNIFICANT CHANGE UP (ref 10.3–14.5)
RBC # FLD: 12.4 % — SIGNIFICANT CHANGE UP (ref 10.3–14.5)
SODIUM SERPL-SCNC: 136 MMOL/L — SIGNIFICANT CHANGE UP (ref 135–145)
SODIUM SERPL-SCNC: 136 MMOL/L — SIGNIFICANT CHANGE UP (ref 135–145)
TRIGL SERPL-MCNC: 59 MG/DL — SIGNIFICANT CHANGE UP
TRIGL SERPL-MCNC: 59 MG/DL — SIGNIFICANT CHANGE UP
TROPONIN I, HIGH SENSITIVITY RESULT: 687.5 NG/L — HIGH
TROPONIN I, HIGH SENSITIVITY RESULT: 687.5 NG/L — HIGH
WBC # BLD: 3.76 K/UL — LOW (ref 3.8–10.5)
WBC # BLD: 3.76 K/UL — LOW (ref 3.8–10.5)
WBC # FLD AUTO: 3.76 K/UL — LOW (ref 3.8–10.5)
WBC # FLD AUTO: 3.76 K/UL — LOW (ref 3.8–10.5)

## 2024-01-11 PROCEDURE — 99239 HOSP IP/OBS DSCHRG MGMT >30: CPT

## 2024-01-11 RX ORDER — METOPROLOL TARTRATE 50 MG
1 TABLET ORAL
Qty: 0 | Refills: 0 | DISCHARGE
Start: 2024-01-11

## 2024-01-11 RX ORDER — ASPIRIN/CALCIUM CARB/MAGNESIUM 324 MG
1 TABLET ORAL
Qty: 0 | Refills: 0 | DISCHARGE
Start: 2024-01-11

## 2024-01-11 RX ORDER — ATORVASTATIN CALCIUM 80 MG/1
1 TABLET, FILM COATED ORAL
Qty: 30 | Refills: 0
Start: 2024-01-11 | End: 2024-02-09

## 2024-01-11 RX ORDER — CANDESARTAN CILEXETIL 8 MG/1
1 TABLET ORAL
Qty: 0 | Refills: 0
Start: 2024-01-11

## 2024-01-11 RX ORDER — ASPIRIN/CALCIUM CARB/MAGNESIUM 324 MG
1 TABLET ORAL
Qty: 30 | Refills: 0
Start: 2024-01-11 | End: 2024-02-09

## 2024-01-11 RX ORDER — CANDESARTAN CILEXETIL 8 MG/1
1 TABLET ORAL
Qty: 0 | Refills: 0 | DISCHARGE
Start: 2024-01-11

## 2024-01-11 RX ORDER — LOSARTAN POTASSIUM 100 MG/1
1 TABLET, FILM COATED ORAL
Qty: 0 | Refills: 0 | DISCHARGE
Start: 2024-01-11

## 2024-01-11 RX ORDER — ATORVASTATIN CALCIUM 80 MG/1
1 TABLET, FILM COATED ORAL
Qty: 0 | Refills: 0 | DISCHARGE
Start: 2024-01-11

## 2024-01-11 RX ORDER — CANDESARTAN CILEXETIL 8 MG/1
1 TABLET ORAL
Qty: 30 | Refills: 0
Start: 2024-01-11 | End: 2024-02-09

## 2024-01-11 RX ORDER — BACLOFEN 100 %
1 POWDER (GRAM) MISCELLANEOUS
Refills: 0 | DISCHARGE

## 2024-01-11 RX ORDER — CANDESARTAN CILEXETIL 8 MG/1
1 TABLET ORAL
Refills: 0 | DISCHARGE

## 2024-01-11 RX ORDER — METOPROLOL TARTRATE 50 MG
1 TABLET ORAL
Qty: 30 | Refills: 0
Start: 2024-01-11 | End: 2024-02-09

## 2024-01-11 RX ADMIN — Medication 25 MILLIGRAM(S): at 05:39

## 2024-01-11 RX ADMIN — Medication 100 MILLIGRAM(S): at 12:39

## 2024-01-11 RX ADMIN — LOSARTAN POTASSIUM 50 MILLIGRAM(S): 100 TABLET, FILM COATED ORAL at 05:39

## 2024-01-11 RX ADMIN — HEPARIN SODIUM 800 UNIT(S)/HR: 5000 INJECTION INTRAVENOUS; SUBCUTANEOUS at 07:13

## 2024-01-11 RX ADMIN — HEPARIN SODIUM 800 UNIT(S)/HR: 5000 INJECTION INTRAVENOUS; SUBCUTANEOUS at 09:03

## 2024-01-11 RX ADMIN — Medication 81 MILLIGRAM(S): at 12:38

## 2024-01-11 RX ADMIN — Medication 1 TABLET(S): at 12:39

## 2024-01-11 RX ADMIN — HEPARIN SODIUM 800 UNIT(S)/HR: 5000 INJECTION INTRAVENOUS; SUBCUTANEOUS at 05:41

## 2024-01-11 NOTE — DISCHARGE NOTE NURSING/CASE MANAGEMENT/SOCIAL WORK - PATIENT PORTAL LINK FT
You can access the FollowMyHealth Patient Portal offered by [facility] by registering at the following website: [email or web address]. By joining [employer]’s FollowMyHealth portal, you will also be able to view your health information using other applications (apps) compatible with our system. You can access the FollowMyHealth Patient Portal offered by [facility] by registering at the following website: [email or web address]. By joining [employer]’s FollowMyHealth portal, you will also be able to view your health information using other applications (apps) compatible with our system.

## 2024-01-11 NOTE — DISCHARGE NOTE PROVIDER - NSDCMRMEDTOKEN_GEN_ALL_CORE_FT
allopurinol 100 mg oral tablet: 1 tab(s) orally once a day  baclofen 10 mg oral tablet: 1 tab(s) orally once a day  calcium (as carbonate) 500 mg oral tablet: 1 tab(s) orally once a day  candesartan 16 mg oral tablet: 1 tab(s) orally once a day  domperidone 10mg: Take 1 tab once a day PRN gas   allopurinol 100 mg oral tablet: 1 tab(s) orally once a day  aspirin 81 mg oral tablet, chewable: 1 tab(s) orally once a day  atorvastatin 40 mg oral tablet: 1 tab(s) orally once a day (at bedtime)  calcium (as carbonate) 500 mg oral tablet: 1 tab(s) orally once a day  losartan 50 mg oral tablet: 1 tab(s) orally once a day  metoprolol succinate 25 mg oral tablet, extended release: 1 tab(s) orally once a day   allopurinol 100 mg oral tablet: 1 tab(s) orally once a day  aspirin 81 mg oral tablet, chewable: 1 tab(s) orally once a day  atorvastatin 40 mg oral tablet: 1 tab(s) orally once a day (at bedtime)  calcium (as carbonate) 500 mg oral tablet: 1 tab(s) orally once a day  candesartan 16 mg oral tablet: 1 tab(s) orally once a day  metoprolol succinate 25 mg oral tablet, extended release: 1 tab(s) orally once a day   allopurinol 100 mg oral tablet: 1 tab(s) orally once a day  aspirin 81 mg oral tablet, chewable: 1 tab(s) orally once a day  atorvastatin 40 mg oral tablet: 1 tab(s) orally once a day (at bedtime)  calcium (as carbonate) 500 mg oral tablet: 1 tab(s) orally once a day  candesartan 16 mg oral tablet: 1 tab(s) orally once a day  candesartan 16 mg oral tablet: 1 tab(s) orally once a day  metoprolol succinate 25 mg oral tablet, extended release: 1 tab(s) orally once a day

## 2024-01-11 NOTE — DISCHARGE NOTE PROVIDER - NSDCCPCAREPLAN_GEN_ALL_CORE_FT
PRINCIPAL DISCHARGE DIAGNOSIS  Diagnosis: SVT (supraventricular tachycardia)  Assessment and Plan of Treatment:       SECONDARY DISCHARGE DIAGNOSES  Diagnosis: Elevated troponin  Assessment and Plan of Treatment:

## 2024-01-11 NOTE — DISCHARGE NOTE PROVIDER - HOSPITAL COURSE
73 year old female with a PMH of MI (20 years ago),  kidney stent, HTN presents to the ED for syncopal episode. As per patient, while boarding a flight this afternoon she started to have heart palpitation, chest pain with  lightheadeness and fell on the aisle due to syncopal episode.  Chest pain resolved prior to ED arrival. Endorses for the past couple months she has been experiencing heart palpitation. Upon evaluation endorses she is back to baseline denies chest pain, palpitations, headache, dizziness or any other pain or discomfort.     Upon ED arrival patient was found to be in SVT with HR 150s, responded to IVP Adenosine. Labs remarkable for Troponin 231.1. ECG: No ST-changes. Started on heparin gtt.  (10 [place] 2024 01:10)    TTE was entirely normal. Labs acceptable, trop elevated from NSTEMI.     Added ASA, statin, Toprol and ACE.     Will see Cardiologist in [place] for stress test as outpatient.     73 year old female with a PMH of MI (20 years ago),  kidney stent, HTN presents to the ED for syncopal episode. As per patient, while boarding a flight this afternoon she started to have heart palpitation, chest pain with  lightheadeness and fell on the aisle due to syncopal episode.  Chest pain resolved prior to ED arrival. Endorses for the past couple months she has been experiencing heart palpitation. Upon evaluation endorses she is back to baseline denies chest pain, palpitations, headache, dizziness or any other pain or discomfort.     Upon ED arrival patient was found to be in SVT with HR 150s, responded to IVP Adenosine. Labs remarkable for Troponin 231.1. ECG: No ST-changes. Started on heparin gtt.  (10 [facility] 2024 01:10)    TTE was entirely normal. Labs acceptable, trop elevated from NSTEMI. No infections here, VSS.     Added ASA, statin, Toprol, continue ACE.  EKG here NSR, all normal intervals.     Will see Cardiologist in [place] for stress test as outpatient.

## 2024-01-11 NOTE — PROGRESS NOTE ADULT - SUBJECTIVE AND OBJECTIVE BOX
INTERVAL HPI/OVERNIGHT EVENTS:  Pt seen and examined at bedside.     Allergies/Intolerance: No Known Allergies      MEDICATIONS  (STANDING):  allopurinol 100 milliGRAM(s) Oral daily  aspirin  chewable 81 milliGRAM(s) Oral daily  atorvastatin 40 milliGRAM(s) Oral at bedtime  calcium carbonate    500 mG (Tums) Chewable 1 Tablet(s) Chew daily  clopidogrel Tablet 75 milliGRAM(s) Oral daily  heparin  Infusion.  Unit(s)/Hr (8 mL/Hr) IV Continuous <Continuous>  losartan 50 milliGRAM(s) Oral daily  metoprolol succinate ER 25 milliGRAM(s) Oral daily    MEDICATIONS  (PRN):  acetaminophen     Tablet .. 650 milliGRAM(s) Oral every 6 hours PRN Temp greater or equal to 38C (100.4F), Mild Pain (1 - 3)  heparin   Injectable 4100 Unit(s) IV Push every 6 hours PRN For aPTT less than 40  melatonin 3 milliGRAM(s) Oral at bedtime PRN Insomnia        ROS: all systems reviewed and wnl      PHYSICAL EXAMINATION:  Vital Signs Last 24 Hrs  T(C): 36.6 (11 Jan 2024 08:00), Max: 37.1 (10 [facility] 2024 15:52)  T(F): 97.9 (11 Jan 2024 08:00), Max: 98.7 (10 [facility] 2024 15:52)  HR: 70 (11 Jan 2024 08:00) (68 - 93)  BP: 137/89 (11 Jan 2024 08:00) (130/89 - 155/87)  BP(mean): 103 (10 [facility] 2024 12:03) (103 - 103)  RR: 18 (11 Jan 2024 08:00) (14 - 22)  SpO2: 100% (11 Jan 2024 08:00) (97% - 100%)    Parameters below as of 11 Jan 2024 08:00  Patient On (Oxygen Delivery Method): room air      CAPILLARY BLOOD GLUCOSE            GENERAL: stable, no palpitations.   NECK: supple, No JVD  CHEST/LUNG: clear to auscultation bilaterally; no rales, rhonchi, or wheezing b/l  HEART: normal S1, S2  ABDOMEN: BS+, soft, ND, NT   EXTREMITIES:  pulses palpable; no clubbing, cyanosis, or edema b/l LEs    LABS:                        10.3   3.76  )-----------( 186      ( 11 Jan 2024 07:49 )             31.8     01-11    136  |  108  |  29<H>  ----------------------------<  96  4.6   |  24  |  1.46<H>    Ca    9.1      11 Jan 2024 07:49    TPro  6.2  /  Alb  3.1<L>  /  TBili  0.7  /  DBili  x   /  AST  16  /  ALT  14  /  AlkPhos  70  01-11    PT/INR - ( 09 Jan 2024 19:47 )   PT: 10.6 sec;   INR: 0.88 ratio         PTT - ( 11 Jan 2024 07:49 )  PTT:61.8 sec  Urinalysis Basic - ( 11 Jan 2024 07:49 )    Color: x / Appearance: x / SG: x / pH: x  Gluc: 96 mg/dL / Ketone: x  / Bili: x / Urobili: x   Blood: x / Protein: x / Nitrite: x   Leuk Esterase: x / RBC: x / WBC x   Sq Epi: x / Non Sq Epi: x / Bacteria: x            
Patient is a 73y old  Female who presents with a chief complaint of Syncope    PAST MEDICAL & SURGICAL HISTORY:    HTN    INTERVAL HISTORY: in no distress, denies any chest pain or sob   	  MEDICATIONS:  MEDICATIONS  (STANDING):  allopurinol 100 milliGRAM(s) Oral daily  aspirin  chewable 81 milliGRAM(s) Oral daily  atorvastatin 40 milliGRAM(s) Oral at bedtime  calcium carbonate    500 mG (Tums) Chewable 1 Tablet(s) Chew daily  losartan 50 milliGRAM(s) Oral daily  metoprolol succinate ER 25 milliGRAM(s) Oral daily    MEDICATIONS  (PRN):  acetaminophen     Tablet .. 650 milliGRAM(s) Oral every 6 hours PRN Temp greater or equal to 38C (100.4F), Mild Pain (1 - 3)  melatonin 3 milliGRAM(s) Oral at bedtime PRN Insomnia    Vitals:  T(F): 98.4 (01-11-24 @ 11:37), Max: 98.7 (01-10-24 @ 15:52)  HR: 74 (01-11-24 @ 11:37) (68 - 78)  BP: 128/76 (01-11-24 @ 11:37) (128/76 - 155/87)  RR: 18 (01-11-24 @ 11:37) (14 - 22)  SpO2: 97% (01-11-24 @ 11:37) (97% - 100%)    Weight (kg): 70 (01-09 @ 19:09)  BMI (kg/m2): 25.7 (01-09 @ 19:09)    PHYSICAL EXAM:  Neuro: Awake, responsive  CV: S1 S2 RRR  Lungs: CTABL  GI: Soft, BS +, ND, NT  Extremities: No edema    TELEMETRY: sinus    RADIOLOGY: < from: US Duplex Carotid Arteries Complete, Bilateral (01.10.24 @ 09:02) >  IMPRESSION: No significant hemodynamic stenosis of either carotid artery.    < end of copied text >    DIAGNOSTIC TESTING:    [x ] Echocardiogram: < from: TTE Echo Complete w/o Contrast w/ Doppler (01.10.24 @ 16:47) >  PHYSICIAN INTERPRETATION:  Left Ventricle:  Global LV systolic function was normal. Spectral Doppler shows impaired   relaxation pattern of left ventricular myocardial filling (Grade I   diastolic dysfunction). LVEF 55-60%.  Pericardium: There is no evidence of pericardial effusion.    Summary:   1. Normal global left ventricular systolic function.   2. LVEF 55-60%.   3. Spectral Doppler shows impaired relaxation pattern of left   ventricular myocardial filling (Grade I diastolic dysfunction).   4. There is no evidence of pericardial effusion.    < end of copied text >    LABS:	 	    CARDIAC MARKERS:  Troponin I, High Sensitivity Result: 1454.6 ng/L (01-10 @ 12:05)  Troponin I, High Sensitivity Result: 3185.2 ng/L (01-10 @ 00:55)  Troponin I, High Sensitivity Result: 231.1 ng/L (01-09 @ 19:47)    11 Jan 2024 07:49    136    |  108    |  29     ----------------------------<  96     4.6     |  24     |  1.46   09 Jan 2024 19:47    132    |  103    |  33     ----------------------------<  116    4.2     |  22     |  1.88     Ca    9.1        11 Jan 2024 07:49    TPro  6.2    /  Alb  3.1    /  TBili  0.7    /  DBili  x      /  AST  16     /  ALT  14     /  AlkPhos  70     11 Jan 2024 07:49                        10.3   3.76  )-----------( 186      ( 11 Jan 2024 07:49 )             31.8 ,                       9.6    4.44  )-----------( 172      ( 10 [facility] 2024 03:50 )             29.4 ,                       11.1   4.60  )-----------( 197      ( 09 Jan 2024 19:47 )             33.7     Lipid Profile: 01-11 @ 07:49  HDL/Total Cholesterol: --  HDL Chol:              55 mg/dL  Serum Chol:            213 mg/dL  Direct LDL:            --  Triglycerides:         59 mg/dL    TSH: Thyroid Stimulating Hormone, Serum: 1.940 uIU/mL (01-09 @ 19:57)    PT/PTT- ( 11 Jan 2024 07:49 )   PT: x    ;  PTT: 61.8 sec    INR: 0.88 ratio (01-09 @ 19:47)

## 2024-01-11 NOTE — PROGRESS NOTE ADULT - ASSESSMENT
73 F HTN, kidney stent now had syncope palpitations and chest pain while boarding airplane. Found to be in SVT, given single dose adenosine for SVT termination.  Trop to 3k Cr 1.88 K 4.2.   Trop downtrending  EKG reviewed no dynamic ST shift likely TYpe 2 MI due to tachycardia.    -cont on asa, add statin for elevated LDL  -cont on Toprol 25/d  -off iv Heparin, d/c plavix  -Echo with EF 55-60%, Grade I DD  -Should follow up with home cardiologist for ischemia investigation, cardiac event monitoring   -case discussed with Dr. He

## 2024-01-11 NOTE — PROGRESS NOTE ADULT - ASSESSMENT
73 year old female with a PMH of MI (20 years ago),  kidney stent, HTN presents to the ED for syncopal episode. As per patient, while boarding a flight this afternoon she started to have heart palpitation, chest pain with  lightheadeness and fell on the aisle due to syncopal episode.  Chest pain resolved prior to ED arrival. Endorses for the past couple months she has been experiencing heart palpitation. Upon evaluation endorses she is back to baseline denies chest pain, palpitations, headache, dizziness or any other pain or discomfort. Upon ED arrival patient was found to be in SVT with HR 150s, responded to Adenosine. Labs remarkable for Troponin 231.1. ECG: No ST-changes. Started on heparin gtt.  73 year old female with a PMH of MI (20 years ago),  kidney stent, HTN presents to the ED for syncopal episode. As per patient, while boarding a flight this afternoon she started to have heart palpitation, chest pain with  lightheadeness and fell on the aisle due to syncopal episode.  Chest pain resolved prior to ED arrival.     Endorses for the past couple months she has been experiencing heart palpitation. Upon evaluation endorses she is back to baseline denies chest pain, palpitations, headache, dizziness or any other pain or discomfort. Upon ED arrival patient was found to be in SVT with HR 150s, responded to Adenosine. Labs remarkable for Troponin 231.1. ECG: No ST-changes. Started on heparin gtt.     Today TTE is normal study, normal EF, no wall motion abnormalities.     Will stop IV Heparin, trop elevation likely stress induced.     A1C 5.8.     Continue Toprol XL daily.     Discharge home today if OK with Cardio.

## 2024-01-11 NOTE — DISCHARGE NOTE NURSING/CASE MANAGEMENT/SOCIAL WORK - NSDCPEFALRISK_GEN_ALL_CORE
For information on Fall & Injury Prevention, visit: [email or web address] OR  [email or web address] OR  https://www.cdc.gov/steadi/patient.html For information on Fall & Injury Prevention, visit: [email or web address] OR  [email or web address] OR  https://www.cdc.gov/steadi/patient.html

## 2024-01-16 DIAGNOSIS — I25.2 OLD MYOCARDIAL INFARCTION: ICD-10-CM

## 2024-01-16 DIAGNOSIS — I10 ESSENTIAL (PRIMARY) HYPERTENSION: ICD-10-CM

## 2024-01-16 DIAGNOSIS — I21.A1 MYOCARDIAL INFARCTION TYPE 2: ICD-10-CM

## 2024-01-16 DIAGNOSIS — I47.10 SUPRAVENTRICULAR TACHYCARDIA, UNSPECIFIED: ICD-10-CM

## 2024-01-16 DIAGNOSIS — I48.92 UNSPECIFIED ATRIAL FLUTTER: ICD-10-CM

## 2024-01-16 DIAGNOSIS — R55 SYNCOPE AND COLLAPSE: ICD-10-CM

## 2024-01-16 DIAGNOSIS — Z79.899 OTHER LONG TERM (CURRENT) DRUG THERAPY: ICD-10-CM

## 2024-01-16 DIAGNOSIS — I21.4 NON-ST ELEVATION (NSTEMI) MYOCARDIAL INFARCTION: ICD-10-CM

## 2024-01-16 DIAGNOSIS — Z11.52 ENCOUNTER FOR SCREENING FOR COVID-19: ICD-10-CM

## 2024-01-20 NOTE — CHART NOTE - NSCHARTNOTEFT_GEN_A_CORE
To Whom It May Concern:    Please use this letter as verfication that the above patient was hospitalized at [facility] from 1/9/2024 to 1/11/2024.  If any questions or concerns please call [phone].     Best Regards,    Paola Alejandre MD
